# Patient Record
Sex: FEMALE | Race: WHITE | NOT HISPANIC OR LATINO | Employment: UNEMPLOYED | URBAN - NONMETROPOLITAN AREA
[De-identification: names, ages, dates, MRNs, and addresses within clinical notes are randomized per-mention and may not be internally consistent; named-entity substitution may affect disease eponyms.]

---

## 2018-11-01 ENCOUNTER — OFFICE VISIT (OUTPATIENT)
Dept: MEDICAL GROUP | Facility: CLINIC | Age: 28
End: 2018-11-01
Payer: MEDICAID

## 2018-11-01 VITALS
HEIGHT: 63 IN | HEART RATE: 98 BPM | RESPIRATION RATE: 12 BRPM | SYSTOLIC BLOOD PRESSURE: 126 MMHG | OXYGEN SATURATION: 93 % | WEIGHT: 186 LBS | TEMPERATURE: 97.1 F | DIASTOLIC BLOOD PRESSURE: 84 MMHG | BODY MASS INDEX: 32.96 KG/M2

## 2018-11-01 DIAGNOSIS — F32.2 CURRENT SEVERE EPISODE OF MAJOR DEPRESSIVE DISORDER WITHOUT PSYCHOTIC FEATURES WITHOUT PRIOR EPISODE (HCC): ICD-10-CM

## 2018-11-01 PROBLEM — F11.11 HISTORY OF HEROIN ABUSE (HCC): Status: ACTIVE | Noted: 2018-11-01

## 2018-11-01 PROCEDURE — 99203 OFFICE O/P NEW LOW 30 MIN: CPT | Performed by: FAMILY MEDICINE

## 2018-11-01 RX ORDER — ARIPIPRAZOLE 5 MG/1
5 TABLET ORAL DAILY
Qty: 30 TAB | Refills: 2 | Status: SHIPPED | OUTPATIENT
Start: 2018-11-01

## 2018-11-01 RX ORDER — LORAZEPAM 1 MG/1
1 TABLET ORAL EVERY 4 HOURS PRN
COMMUNITY

## 2018-11-01 RX ORDER — BUPROPION HYDROCHLORIDE 150 MG/1
150 TABLET ORAL EVERY MORNING
Qty: 30 TAB | Refills: 2 | Status: SHIPPED | OUTPATIENT
Start: 2018-11-01 | End: 2019-01-29 | Stop reason: SDUPTHER

## 2018-11-01 RX ORDER — ARIPIPRAZOLE 5 MG/1
5 TABLET ORAL DAILY
COMMUNITY
End: 2018-11-01 | Stop reason: SDUPTHER

## 2018-11-01 RX ORDER — SERTRALINE HYDROCHLORIDE 100 MG/1
100 TABLET, FILM COATED ORAL 2 TIMES DAILY
Qty: 30 TAB | Refills: 2 | Status: SHIPPED | OUTPATIENT
Start: 2018-11-01

## 2018-11-01 RX ORDER — BUPRENORPHINE AND NALOXONE 8; 2 MG/1; MG/1
FILM, SOLUBLE BUCCAL; SUBLINGUAL
COMMUNITY

## 2018-11-01 RX ORDER — BUPROPION HYDROCHLORIDE 75 MG/1
75 TABLET ORAL 2 TIMES DAILY
COMMUNITY
End: 2018-11-01

## 2018-11-01 RX ORDER — SERTRALINE HYDROCHLORIDE 100 MG/1
100 TABLET, FILM COATED ORAL 2 TIMES DAILY
COMMUNITY
End: 2018-11-01 | Stop reason: SDUPTHER

## 2018-11-01 NOTE — PROGRESS NOTES
Complaint: Med refills.     Subjective:     Isela Henry is a 27 y.o. female here today for med refills. Recently moved back from [place], where she had lived for 5 years. [facility], couldn't get in for at least 1 month.    Major depressive disorder  Needs refills on her meds. Has not made appointment with mental health yet.    History of heroin abuse  Will be seeing Dr. Cooper in [place] for her Suboxone and Ativan refills.     Requesting labs.      Current medicines (including changes today)  Current Outpatient Prescriptions   Medication Sig Dispense Refill   • Buprenorphine HCl-Naloxone HCl (SUBOXONE) 8-2 MG FILM Place  under tongue.     • LORazepam (ATIVAN) 1 MG Tab Take 1 mg by mouth every four hours as needed for Anxiety.     • sertraline (ZOLOFT) 100 MG Tab Take 1 Tab by mouth 2 Times a Day. 30 Tab 2   • aripiprazole (ABILIFY) 5 MG tablet Take 1 Tab by mouth every day. 30 Tab 2   • buPROPion (WELLBUTRIN XL) 150 MG XL tablet Take 1 Tab by mouth every morning. 30 Tab 2     No current facility-administered medications for this visit.      She  has a past medical history of Anxiety (2010); Depression; History of heroin abuse; and Social phobia (2009).    Health Maintenance: not addressed at this visit      Allergies: Haldol decanoate; Amoxicillin trihydrate; Cillins [penicillins]; and Benadryl [diphenhydramine hcl]    Current Outpatient Prescriptions Ordered in [facility]   Medication Sig Dispense Refill   • Buprenorphine HCl-Naloxone HCl (SUBOXONE) 8-2 MG FILM Place  under tongue.     • LORazepam (ATIVAN) 1 MG Tab Take 1 mg by mouth every four hours as needed for Anxiety.     • sertraline (ZOLOFT) 100 MG Tab Take 1 Tab by mouth 2 Times a Day. 30 Tab 2   • aripiprazole (ABILIFY) 5 MG tablet Take 1 Tab by mouth every day. 30 Tab 2   • buPROPion (WELLBUTRIN XL) 150 MG XL tablet Take 1 Tab by mouth every morning. 30 Tab 2     No current Epic-ordered facility-administered medications on file.        Past Medical  "History:   Diagnosis Date   • Anxiety 2010   • Depression     At age 16   • History of heroin abuse    • Social phobia 2009       History reviewed. No pertinent surgical history.    Social History   Substance Use Topics   • Smoking status: Former Smoker     Packs/day: 0.50     Years: 1.00     Types: Cigarettes     Quit date: 8/1/2013   • Smokeless tobacco: Never Used      Comment: Pt states she last smoked 08/13   • Alcohol use No       Social History     Social History Narrative   • No narrative on file       Family History   Problem Relation Age of Onset   • Diabetes Mother         Insulin   • Depression Mother    • Other Father         COPD, Hep C   • Alcohol/Drug Father    • Arthritis Father    • Depression Sister    • Alcohol/Drug Sister          ROS   Patient denies any fever, chills, unintentional weight gain/loss, fatigue, stroke symptoms, dizziness, headache, nasal congestion, sore-throat, cough, heartburn, chest pain, difficulty breathing, abdominal discomfort, diarrhea/constipation, burning with urination or frequency, joint or back pain, skin rashes, depression or anxiety.       Objective:     Blood pressure 126/84, pulse 98, temperature 36.2 °C (97.1 °F), temperature source Temporal, resp. rate 12, height 1.6 m (5' 3\"), weight 84.4 kg (186 lb), SpO2 93 %, unknown if currently breastfeeding. Body mass index is 32.95 kg/m².   Physical Exam:  Constitutional: Alert, no distress.  No formal exam  Psych: Alert and oriented x3, appropriate affect and mood.        Assessment and Plan:   The following treatment plan was discussed    1. Current severe episode of major depressive disorder without psychotic features without prior episode (HCC)  Chronic problem. Stable on meds. Might have a problem getting [facility] to cover Abilify without prior auth. Will notify with lab test results. Needs to make appointment at [facility].  - sertraline (ZOLOFT) 100 MG Tab; Take 1 Tab by mouth 2 Times a Day.  Dispense: 30 " Tab; Refill: 2  - aripiprazole (ABILIFY) 5 MG tablet; Take 1 Tab by mouth every day.  Dispense: 30 Tab; Refill: 2  - buPROPion (WELLBUTRIN XL) 150 MG XL tablet; Take 1 Tab by mouth every morning.  Dispense: 30 Tab; Refill: 2  - CBC WITH DIFFERENTIAL; Future  - COMP METABOLIC PANEL; Future  - TSH WITH REFLEX TO FT4; Future      Followup: Return if symptoms worsen or fail to improve.    Please note that this dictation was created using voice recognition software. I have made every reasonable attempt to correct obvious errors, but I expect that there are errors of grammar and possibly content that I did not discover before finalizing the note.

## 2021-01-14 ENCOUNTER — HOSPITAL ENCOUNTER (OUTPATIENT)
Facility: MEDICAL CENTER | Age: 31
End: 2021-01-14
Attending: PHYSICIAN ASSISTANT
Payer: MEDICAID

## 2021-01-14 ENCOUNTER — OFFICE VISIT (OUTPATIENT)
Dept: URGENT CARE | Facility: PHYSICIAN GROUP | Age: 31
End: 2021-01-14

## 2021-01-14 VITALS
HEART RATE: 91 BPM | WEIGHT: 152 LBS | TEMPERATURE: 99.1 F | HEIGHT: 62 IN | BODY MASS INDEX: 27.97 KG/M2 | RESPIRATION RATE: 14 BRPM | OXYGEN SATURATION: 95 % | SYSTOLIC BLOOD PRESSURE: 122 MMHG | DIASTOLIC BLOOD PRESSURE: 80 MMHG

## 2021-01-14 DIAGNOSIS — N89.8 VAGINA ITCHING: ICD-10-CM

## 2021-01-14 LAB
APPEARANCE UR: NORMAL
BILIRUB UR STRIP-MCNC: NORMAL MG/DL
COLOR UR AUTO: YELLOW
GLUCOSE UR STRIP.AUTO-MCNC: NORMAL MG/DL
KETONES UR STRIP.AUTO-MCNC: NORMAL MG/DL
LEUKOCYTE ESTERASE UR QL STRIP.AUTO: NORMAL
NITRITE UR QL STRIP.AUTO: NORMAL
PH UR STRIP.AUTO: 7 [PH] (ref 5–8)
PROT UR QL STRIP: NORMAL MG/DL
RBC UR QL AUTO: NORMAL
SP GR UR STRIP.AUTO: 1.02
UROBILINOGEN UR STRIP-MCNC: 0.2 MG/DL

## 2021-01-14 PROCEDURE — 99203 OFFICE O/P NEW LOW 30 MIN: CPT | Mod: 25 | Performed by: PHYSICIAN ASSISTANT

## 2021-01-14 PROCEDURE — 87660 TRICHOMONAS VAGIN DIR PROBE: CPT

## 2021-01-14 PROCEDURE — 87480 CANDIDA DNA DIR PROBE: CPT

## 2021-01-14 PROCEDURE — 81002 URINALYSIS NONAUTO W/O SCOPE: CPT | Performed by: PHYSICIAN ASSISTANT

## 2021-01-14 PROCEDURE — 87510 GARDNER VAG DNA DIR PROBE: CPT

## 2021-01-14 NOTE — PROGRESS NOTES
Chief Complaint   Patient presents with   • Painful Urination       HISTORY OF PRESENT ILLNESS: Patient is a 30 y.o. female who presents today because she has an unknown length of history of vaginal itching.  No other significant symptoms.  She is in a drug treatment center and states that she just got sober but does not know how long she has had vaginal itching.  She has not been taking anything for her symptoms    Patient Active Problem List    Diagnosis Date Noted   • History of heroin abuse ([facility]) 2018   • Major depressive disorder 2014   • Anxiety 2014   • Social phobia 2014       Allergies:Haldol decanoate, Amoxicillin trihydrate, Cillins [penicillins], and Benadryl [diphenhydramine hcl]    Current Outpatient Medications Ordered in Epic   Medication Sig Dispense Refill   • buPROPion (WELLBUTRIN XL) 150 MG XL tablet TAKE 1 TABLET BY MOUTH EVERY DAY IN THE MORNING 30 Tab 0   • Buprenorphine HCl-Naloxone HCl (SUBOXONE) 8-2 MG FILM Place  under tongue.     • LORazepam (ATIVAN) 1 MG Tab Take 1 mg by mouth every four hours as needed for Anxiety.     • sertraline (ZOLOFT) 100 MG Tab Take 1 Tab by mouth 2 Times a Day. 30 Tab 2   • aripiprazole (ABILIFY) 5 MG tablet Take 1 Tab by mouth every day. 30 Tab 2     No current Epic-ordered facility-administered medications on file.        Past Medical History:   Diagnosis Date   • Anxiety    • Depression     At age 16   • History of heroin abuse ([facility])    • Social phobia        Social History     Tobacco Use   • Smoking status: Former Smoker     Packs/day: 0.50     Years: 1.00     Pack years: 0.50     Types: Cigarettes     Quit date: 2013     Years since quittin.4   • Smokeless tobacco: Never Used   • Tobacco comment: Pt states she last smoked    Substance Use Topics   • Alcohol use: No   • Drug use: Yes     Types: Marijuana       Family Status   Relation Name Status   • Mo  Alive   • Fa  Alive   • Sis 2 Alive   • Bro 1 Alive   •  "MGMo  Alive   • MGFa     • PGMo     • PGFa       Family History   Problem Relation Age of Onset   • Diabetes Mother         Insulin   • Depression Mother    • Other Father         COPD, Hep C   • Alcohol/Drug Father    • Arthritis Father    • Depression Sister    • Alcohol/Drug Sister        ROS:  Review of Systems   Constitutional: Negative for fever, chills, weight loss and malaise/fatigue.   HENT: Negative for ear pain, nosebleeds, congestion, sore throat and neck pain.    Eyes: Negative for blurred vision.   Respiratory: Negative for cough, sputum production, shortness of breath and wheezing.    Cardiovascular: Negative for chest pain, palpitations, orthopnea and leg swelling.   Gastrointestinal: Negative for heartburn, nausea, vomiting and abdominal pain.   Genitourinary: Negative for dysuria, urgency and frequency.  Positive for vaginal itching as in HPI    Exam:  /80   Pulse 91   Temp 37.3 °C (99.1 °F) (Temporal)   Resp 14   Ht 1.575 m (5' 2\")   Wt 68.9 kg (152 lb)   SpO2 95%   General:  Well nourished, well developed female in NAD  Head:Normocephalic, atraumatic  Eyes: PERRLA, EOM within normal limits, no conjunctival injection, no scleral icterus, visual fields and acuity grossly intact.  Nose: Symmetrical without tenderness, no discharge.  Mouth: reasonable hygiene, no erythema exudates or tonsillar enlargement.  Neck: no masses, range of motion within normal limits, no tracheal deviation. No obvious thyroid enlargement.  Extremities: no clubbing, cyanosis, or edema.    UA unremarkable    Please note that this dictation was created using voice recognition software. I have made every reasonable attempt to correct obvious errors, but I expect that there are errors of grammar and possibly content that I did not discover before finalizing the note.    Assessment/Plan:  1. Vagina itching  VAGINAL PATHOGENS DNA PANEL    POCT Urinalysis       Followup with primary care in the " next 7-10 days if not significantly improving, return to the urgent care or go to the emergency room sooner for any worsening of symptoms.

## 2021-01-15 DIAGNOSIS — N89.8 VAGINA ITCHING: ICD-10-CM

## 2021-01-15 LAB
CANDIDA DNA VAG QL PROBE+SIG AMP: NEGATIVE
G VAGINALIS DNA VAG QL PROBE+SIG AMP: NEGATIVE
T VAGINALIS DNA VAG QL PROBE+SIG AMP: NEGATIVE